# Patient Record
Sex: FEMALE
[De-identification: names, ages, dates, MRNs, and addresses within clinical notes are randomized per-mention and may not be internally consistent; named-entity substitution may affect disease eponyms.]

---

## 2018-04-29 ENCOUNTER — TRANSCRIPTION ENCOUNTER (OUTPATIENT)
Age: 33
End: 2018-04-29

## 2024-05-07 ENCOUNTER — APPOINTMENT (OUTPATIENT)
Dept: ORTHOPEDIC SURGERY | Facility: CLINIC | Age: 39
End: 2024-05-07
Payer: COMMERCIAL

## 2024-05-07 VITALS — WEIGHT: 138 LBS | BODY MASS INDEX: 21.66 KG/M2 | HEIGHT: 67 IN

## 2024-05-07 DIAGNOSIS — M65.9 SYNOVITIS AND TENOSYNOVITIS, UNSPECIFIED: ICD-10-CM

## 2024-05-07 DIAGNOSIS — Z78.9 OTHER SPECIFIED HEALTH STATUS: ICD-10-CM

## 2024-05-07 DIAGNOSIS — M25.531 PAIN IN RIGHT WRIST: ICD-10-CM

## 2024-05-07 DIAGNOSIS — M79.645 PAIN IN LEFT FINGER(S): ICD-10-CM

## 2024-05-07 PROBLEM — Z00.00 ENCOUNTER FOR PREVENTIVE HEALTH EXAMINATION: Status: ACTIVE | Noted: 2024-05-07

## 2024-05-07 PROCEDURE — 73110 X-RAY EXAM OF WRIST: CPT | Mod: RT

## 2024-05-07 PROCEDURE — 73140 X-RAY EXAM OF FINGER(S): CPT | Mod: LT

## 2024-05-07 PROCEDURE — 99203 OFFICE O/P NEW LOW 30 MIN: CPT

## 2024-05-09 NOTE — HISTORY OF PRESENT ILLNESS
[Dull/Aching] : dull/aching [Localized] : localized [Radiating] : radiating [Shooting] : shooting [Tightness] : tightness [de-identified] : 39 year old female presenting with LEFT ring finger pain for the past 3-4 months. Pain is only with certain activities. No injury/trauma. She is also with some generalized RT wrist pain.   [] : no [FreeTextEntry1] : b/l wrist, LFT ring finger [FreeTextEntry5] : b/l wrist, LFT ring finger pain

## 2024-05-09 NOTE — PHYSICAL EXAM
[4th] : 4th [MCP Joint] : MCP joint [Right] : right wrist [Left] : left fingers [] : no pain with range of motion [FreeTextEntry3] : UCL and RCL stable  [de-identified] : forced ring finger MP flexion  [FreeTextEntry8] : OC 5/7/24: RT wrist, neutral ulnar variance, radial styloid calcification  [FreeTextEntry9] : OC 5/7/24: LT ring finger, small volar radial calcification

## 2024-05-09 NOTE — DISCUSSION/SUMMARY
[de-identified] : Discussed the nature of the diagnosis and risk and benefits of different modalities of treatment. LT ring finger x-rays reviewed and discussed. RT wrist synovitis, consider ulnar carpal abutment. No tenderness upon exam.  LT ring finger synovitis Conservative management  Warm compress and NSAIDs RTO 3-4 weeks.

## 2024-06-04 ENCOUNTER — APPOINTMENT (OUTPATIENT)
Dept: ORTHOPEDIC SURGERY | Facility: CLINIC | Age: 39
End: 2024-06-04

## 2024-09-09 ENCOUNTER — APPOINTMENT (OUTPATIENT)
Dept: RADIOLOGY | Facility: HOSPITAL | Age: 39
End: 2024-09-09